# Patient Record
Sex: FEMALE | Race: AMERICAN INDIAN OR ALASKA NATIVE | Employment: OTHER | URBAN - METROPOLITAN AREA
[De-identification: names, ages, dates, MRNs, and addresses within clinical notes are randomized per-mention and may not be internally consistent; named-entity substitution may affect disease eponyms.]

---

## 2024-10-29 PROBLEM — C54.1 ENDOMETRIAL ADENOCARCINOMA (HCC): Status: ACTIVE | Noted: 2024-10-29

## 2024-10-29 PROBLEM — C54.1 ENDOMETRIAL CARCINOMA (HCC): Status: ACTIVE | Noted: 2024-10-29

## 2025-02-21 ENCOUNTER — OFFICE VISIT (OUTPATIENT)
Age: 65
End: 2025-02-21
Payer: OTHER GOVERNMENT

## 2025-02-21 VITALS
WEIGHT: 213.6 LBS | OXYGEN SATURATION: 98 % | HEART RATE: 110 BPM | BODY MASS INDEX: 34.48 KG/M2 | DIASTOLIC BLOOD PRESSURE: 82 MMHG | SYSTOLIC BLOOD PRESSURE: 152 MMHG

## 2025-02-21 DIAGNOSIS — R94.31 ABNORMAL ELECTROCARDIOGRAPHY: Primary | ICD-10-CM

## 2025-02-21 PROCEDURE — 99204 OFFICE O/P NEW MOD 45 MIN: CPT | Performed by: INTERNAL MEDICINE

## 2025-02-21 RX ORDER — VITAMIN B COMPLEX
1 CAPSULE ORAL DAILY
COMMUNITY

## 2025-02-21 NOTE — PROGRESS NOTES
[facility]    Sri Hutchins is 64 y.o. female     Patient is here today for cardiac evaluation  Denies prior history of MI or CHF  Patient follows up with [facility].  Was asked to come see me for abnormal EKG  Patient denies any resting or exertional chest pain or chest tightness.  Patient does have some shortness of breath however she is that she has asthma and also she believes that it is likely because of her weight gain and obesity.  She uses 2 pillows at night.  No palpitation, presyncope or syncope.  Denies resting or exertional chest pain or chest tightness.  No significant orthopnea or PND  Denies any nausea, vomiting, abdominal pain, fever, chills, sputum production. No hematuria or other bleeding complaints    Past Medical History:   Diagnosis Date    Endometrial cancer (HCC)     Hyperlipidemia        Review of Systems:  Cardiac symptoms as noted above in HPI. All others negative.  Denies fatigue, malaise, skin rash, joint pain, blurring vision, photophobia, neck pain, hemoptysis, chronic cough, nausea, vomiting, hematuria, burning micturition, BRBPR, chronic headaches.    Current Outpatient Medications   Medication Sig    b complex vitamins capsule Take 1 capsule by mouth daily    Cetirizine HCl 10 MG CAPS Cetirizine Oral    daily    active    folic acid (FOLVITE) 800 MCG tablet Folic Acid Oral        active    Multiple Vitamins-Minerals (THERAPEUTIC MULTIVITAMIN-MINERALS) tablet Take 1 tablet by mouth daily    Cyanocobalamin (VITAMIN B-12 CR PO) Take by mouth    Flaxseed, Linseed, (FLAX SEED OIL PO) Take by mouth    Magnesium Gluconate (MAGNESIUM 27 PO) Take by mouth    Chromium Picolinate (CHROMIUM PICOLATE PO) Take by mouth    Garlic 2 MG CAPS Take by mouth    Ascorbic Acid (VITAMIN C) 250 MG tablet Take 1 tablet by mouth daily    vitamin D (VITAMIN D3) 50 MCG (2000 UT) CAPS capsule Take 1 capsule by mouth daily    Misc